# Patient Record
Sex: FEMALE | Race: WHITE
[De-identification: names, ages, dates, MRNs, and addresses within clinical notes are randomized per-mention and may not be internally consistent; named-entity substitution may affect disease eponyms.]

---

## 2018-08-12 ENCOUNTER — HOSPITAL ENCOUNTER (EMERGENCY)
Dept: HOSPITAL 45 - C.EDB | Age: 23
Discharge: HOME | End: 2018-08-12
Payer: COMMERCIAL

## 2018-08-12 VITALS
BODY MASS INDEX: 19.99 KG/M2 | BODY MASS INDEX: 19.99 KG/M2 | HEIGHT: 64.02 IN | HEIGHT: 64.02 IN | WEIGHT: 117.07 LBS | WEIGHT: 117.07 LBS

## 2018-08-12 VITALS — DIASTOLIC BLOOD PRESSURE: 72 MMHG | OXYGEN SATURATION: 100 % | SYSTOLIC BLOOD PRESSURE: 113 MMHG | HEART RATE: 99 BPM

## 2018-08-12 VITALS — TEMPERATURE: 97.88 F

## 2018-08-12 DIAGNOSIS — Z88.0: ICD-10-CM
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
## 2018-08-12 DIAGNOSIS — S50.12XA: Primary | ICD-10-CM

## 2018-08-12 DIAGNOSIS — Z72.0: ICD-10-CM

## 2018-08-12 NOTE — DIAGNOSTIC IMAGING REPORT
L WRIST W/NAVICULAR MIN 3 VIEWS



CLINICAL HISTORY: Left wrist pain extending into the forearm.    



COMPARISON: None.



FINDINGS:  Alignment of the left wrist is anatomic. No acute fracture is

identified. Joint spaces are clear. No erosions are identified.



IMPRESSION: No acute fracture or dislocation within the left wrist.







Electronically signed by:  Agus Cantu M.D.

8/12/2018 4:04 PM



Dictated Date/Time:  8/12/2018 4:03 PM

## 2018-08-12 NOTE — DIAGNOSTIC IMAGING REPORT
L ELBOW MIN 3 VIEWS ROUTINE



CLINICAL HISTORY: fall into door frame; wrist forearm and elbow pain    



COMPARISON: None



FINDINGS:  Alignment of the left elbow is anatomic. There is no fracture or

joint effusion. No osseous lesion is identified.



IMPRESSION: No acute fracture or joint effusion of the left elbow.







Electronically signed by:  Agus Cantu M.D.

8/12/2018 4:40 PM



Dictated Date/Time:  8/12/2018 4:39 PM

## 2018-08-14 NOTE — EMERGENCY ROOM VISIT NOTE
ED Visit Note


First contact with patient:  15:54


Chief Complaint: I hurt my left arm.





History of Present Illness: Ms. Hsu is a 23-year-old white female who 

ambulates into the accompanied by a female friend complaining of left lower arm 

pain.


Patient reports yesterday she tripped while wearing flip-flops and struck her 

left forearm on a door frame.  Since that time she reports that she is having 

pain over the left forearm that extends into the wrist and elbow.  She 

describes her pain as sharp and shooting.  At rest she rates her discomfort 2/

10 with any movements of the wrist, elbow and forearm her pain escalates to 8/

10.  She denies true radiation of pain.  As previously noted all movements of 

the wrist, forearm and elbow increases her discomfort, palpation increases her 

discomfort.  She has not identified any alleviating factors related to the 

pain.  She reports she has been using over-the-counter ibuprofen without relief 

of her discomfort.  Associated with her pain she reports she has a mild 

tingling sensations in the little and ring fingers.


She denies any previous significant injuries to the forearm, wrist or elbow or 

surgeries.





Review of Systems: As noted above in history of present illness. 





Past Medical History: Patient denies.


Current Medications: Patient denies.


Allergies to Medications: Penicillin.


Social History: Patient is currently employed; she feels safe in her home 

environment; she admits to tobacco use and denies alcohol use.





Physical Examination:


Vital Signs: 








  Date Time  Temp Pulse Resp B/P (MAP) Pulse Ox O2 Delivery O2 Flow Rate FiO2


 


8/12/18 17:15  99 18 113/72 100   


 


8/12/18 15:38 36.6 118 18 131/86 99 Room Air  





GENERAL: 23-year-old female in mild to moderate distress due to pain, nontoxic-

appearing, afebrile and hemodynamically stable.


NEUROLOGICAL: Awake, alert and oriented to person, place and time.  Answering 

questions appropriately and following commands.  


SKIN: Warm, dry and pink.  No open soft tissue trauma noted.


LEFT UPPER EXTREMITY: No gross bony deformities.  No tenderness throughout the 

shoulder or humerus.  Moderate tenderness over the proximal radius and ulna at 

the level of the elbow.  Minimal tenderness over the olecranon process.  In 

this area do not appreciate any bony deformity or crepitus.  There is moderate 

tenderness over the middle aspect of the medial and posterior forearm.  There 

is a large contusion in this area.  There is questionable deformity of the ulna 

but no appreciable crepitus.  Additionally there is tenderness diffusely 

throughout the carpals.  I do not appreciate any bony deformity or crepitus.  

No specific tenderness in the anatomical snuffbox.  No tenderness throughout 

the hands or fingers.  She refuses to do all range of motion in the elbow, 

forearm and wrist due to pain.  Throughout the hand the skin was warm and pink 

and capillary refill was brisk.  She was able to distinguish light sensations 

to all dermatomes of the hand.





ED Course:


Patient is assessed as noted above.


Patient's medication list was reviewed.


Patient was offered pain medication and refused and was given ice pack for pain 

and comfort.


Left Elbow X-Rays: Were read by myself and the radiologist no acute fractures 

or dislocations.  No joint effusions.  No elevation of the fat pads.


Left Wrist with Navicular X-Rays: Should be noted this x-ray had slight 

extension into the middle aspect of the forearm which overlapped the elbow x-

ray.  Was read by myself and the radiologist showing no acute fractures or 

dislocations.


Patient was placed in a wrist lacer splint and sling.


Patient was educated about today's findings and instructed on her treatment plan

; she verbalized understanding and agreement with this plan.





Clinical Impression: Left forearm contusion.





Disposition: Patient discharged to home in stable condition accompanied by her 

mother; prior to departure she was reassessed and subjectively reported she was 

feeling slightly worse and rated her discomfort 5/10.





Plan:


Patient was encouraged to alternate ibuprofen and acetaminophen every 3 hours 

as needed for pain, use ice for pain and swelling and use splint and sling 

until pain-free.


Patient was signed off of work for 3 days.


Patient was encouraged to follow-up with her PCP for recheck in 4-5 days if no 

better for possible referral to orthopedics.


Patient was encouraged return the ED for worsening/uncontrolled pain, 

uncontrolled swelling, worsening numbness/tingling of the hand or any new/

concerning symptoms.